# Patient Record
Sex: MALE | Race: WHITE | NOT HISPANIC OR LATINO | Employment: FULL TIME | URBAN - METROPOLITAN AREA
[De-identification: names, ages, dates, MRNs, and addresses within clinical notes are randomized per-mention and may not be internally consistent; named-entity substitution may affect disease eponyms.]

---

## 2018-10-10 ENCOUNTER — HOSPITAL ENCOUNTER (OUTPATIENT)
Dept: LAB | Facility: MEDICAL CENTER | Age: 28
End: 2018-10-10
Attending: NURSE PRACTITIONER
Payer: COMMERCIAL

## 2018-10-10 ENCOUNTER — OFFICE VISIT (OUTPATIENT)
Dept: MEDICAL GROUP | Facility: MEDICAL CENTER | Age: 28
End: 2018-10-10
Payer: COMMERCIAL

## 2018-10-10 VITALS
TEMPERATURE: 96.8 F | BODY MASS INDEX: 19.61 KG/M2 | HEIGHT: 70 IN | RESPIRATION RATE: 16 BRPM | SYSTOLIC BLOOD PRESSURE: 100 MMHG | OXYGEN SATURATION: 98 % | DIASTOLIC BLOOD PRESSURE: 70 MMHG | WEIGHT: 137 LBS | HEART RATE: 61 BPM

## 2018-10-10 DIAGNOSIS — R42 DIZZINESS: ICD-10-CM

## 2018-10-10 DIAGNOSIS — M79.662 BILATERAL CALF PAIN: ICD-10-CM

## 2018-10-10 DIAGNOSIS — F41.9 ANXIETY: ICD-10-CM

## 2018-10-10 DIAGNOSIS — M79.661 BILATERAL CALF PAIN: ICD-10-CM

## 2018-10-10 LAB
ALBUMIN SERPL BCP-MCNC: 4.8 G/DL (ref 3.2–4.9)
ALBUMIN/GLOB SERPL: 1.6 G/DL
ALP SERPL-CCNC: 53 U/L (ref 30–99)
ALT SERPL-CCNC: 17 U/L (ref 2–50)
ANION GAP SERPL CALC-SCNC: 7 MMOL/L (ref 0–11.9)
AST SERPL-CCNC: 17 U/L (ref 12–45)
BILIRUB SERPL-MCNC: 0.9 MG/DL (ref 0.1–1.5)
BUN SERPL-MCNC: 11 MG/DL (ref 8–22)
CALCIUM SERPL-MCNC: 10.1 MG/DL (ref 8.5–10.5)
CHLORIDE SERPL-SCNC: 104 MMOL/L (ref 96–112)
CO2 SERPL-SCNC: 29 MMOL/L (ref 20–33)
CREAT SERPL-MCNC: 1.08 MG/DL (ref 0.5–1.4)
ERYTHROCYTE [DISTWIDTH] IN BLOOD BY AUTOMATED COUNT: 39.7 FL (ref 35.9–50)
GLOBULIN SER CALC-MCNC: 3 G/DL (ref 1.9–3.5)
GLUCOSE SERPL-MCNC: 90 MG/DL (ref 65–99)
HCT VFR BLD AUTO: 45.7 % (ref 42–52)
HGB BLD-MCNC: 15.7 G/DL (ref 14–18)
MCH RBC QN AUTO: 30.8 PG (ref 27–33)
MCHC RBC AUTO-ENTMCNC: 34.4 G/DL (ref 33.7–35.3)
MCV RBC AUTO: 89.8 FL (ref 81.4–97.8)
PLATELET # BLD AUTO: 243 K/UL (ref 164–446)
PMV BLD AUTO: 10 FL (ref 9–12.9)
POTASSIUM SERPL-SCNC: 4.3 MMOL/L (ref 3.6–5.5)
PROT SERPL-MCNC: 7.8 G/DL (ref 6–8.2)
RBC # BLD AUTO: 5.09 M/UL (ref 4.7–6.1)
SODIUM SERPL-SCNC: 140 MMOL/L (ref 135–145)
TSH SERPL DL<=0.005 MIU/L-ACNC: 3.25 UIU/ML (ref 0.38–5.33)
WBC # BLD AUTO: 5.6 K/UL (ref 4.8–10.8)

## 2018-10-10 PROCEDURE — 84443 ASSAY THYROID STIM HORMONE: CPT

## 2018-10-10 PROCEDURE — 85027 COMPLETE CBC AUTOMATED: CPT

## 2018-10-10 PROCEDURE — 80053 COMPREHEN METABOLIC PANEL: CPT

## 2018-10-10 PROCEDURE — 36415 COLL VENOUS BLD VENIPUNCTURE: CPT

## 2018-10-10 PROCEDURE — 99203 OFFICE O/P NEW LOW 30 MIN: CPT | Performed by: NURSE PRACTITIONER

## 2018-10-10 NOTE — PROGRESS NOTES
"CC: establish care/dizziness      Osvaldo Newby is a 28 y.o. male here to establish care and to discuss the evaluation and management of:    Osvaldo is here to establish care.  He has not had a primary care in several years.  He denies any significant medical history.  He denies any significant surgical history.  He does report that his maternal grandfather did have prostate cancer.  He denies any significant family history of diabetes, hyperlipidemia, hypertension or sudden cardiac death.  He does not use tobacco products or illicit drugs.  He does have approximately 2 alcoholic beverages per week.  Currently does not take any medications on a daily basis.     Dizziness  Patient states that he has been having some dizziness for quite some time.  States that he feels like he is lightheaded approximately 70% of the time.  Denies any syncopal episodes.  He does make note that he has had some intermittent discomfort in his chest, states it is a quick sharp pain and then it goes away.  He denies any excessive consumption of caffeine, energy drinks, coffee or sodas.  He does state he stays well-hydrated.  He does make note that he does not eat regularly.  Denies any heart flutters.  He states he has not followed up with an eye care specialist in about 2 years.  He does wear corrective lenses.  Denies any vision changes.  Denies any migraines.     Anxiety  Patient makes mention that he does have some concern or possibly anxiety related to trying to figure out why he is lightheaded.  He has mentioned multiple times that this could \"just be all in his head \"and he is making it worse by over analyzing and constantly focusing on it.    Calf pain  Patient states that he is been having some intermittent achy calf pain for the last 9-12 months.  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  He has recently started since this summer riding his bike to work about 3-4 times a week.  The total trip is 8 miles.  This does not make a " "difference in his calf discomfort.  Denies any swelling in his legs.  He states he notices after he has been sitting for a long period of time.        ROS:  Denies any Headache, Blurred Vision, Confusion, Chest pain,  Shortness of breath,  Abdominal pain, Changes of bowel or bladder, Hematuria, Hematochezia, Lower ext. edema, Fevers, Nights sweats, Weight Changes, Focal weakness or numbness.  And all other systems are negative.  Lightheadedness and calf pain    No current outpatient prescriptions on file.    No Known Allergies    History reviewed. No pertinent past medical history.  History reviewed. No pertinent surgical history.  Family History   Problem Relation Age of Onset   • No Known Problems Mother    • No Known Problems Father    • Prostate cancer Maternal Grandfather      Social History     Social History   • Marital status:      Spouse name: N/A   • Number of children: N/A   • Years of education: N/A     Occupational History   • Not on file.     Social History Main Topics   • Smoking status: Never Smoker   • Smokeless tobacco: Never Used   • Alcohol use Yes      Comment: 2 drinks per week   • Drug use: No   • Sexual activity: Yes     Partners: Female     Other Topics Concern   • Not on file     Social History Narrative   • No narrative on file       Objective:     Vitals: /70 (BP Location: Right arm, Patient Position: Sitting)   Pulse 61   Temp 36 °C (96.8 °F)   Resp 16   Ht 1.778 m (5' 10\")   Wt 62.1 kg (137 lb)   SpO2 98%   BMI 19.66 kg/m²      General: Alert, pleasant, NAD  HEENT:  Normocephalic.    Heart:  Regular rate and rhythm.  S1 and S2 normal.  No murmurs appreciated.    Respiratory:  Normal respiratory effort.  Clear to auscultation bilaterally.    Skin:  Warm, dry, no rashes  Musculoskeletal:  Gait is normal.  Moves all extremities well.  Extremities:   No leg edema.  Neurological: No tremors, sensation grossly intact  Psych:  Affect/mood is normal, judgement is good, " memory is intact, grooming is appropriate.      Assessment and Plan.   28 y.o. male to establish care and discuss the following    1. Dizziness  Chronic. Not affecting daily functions. Patient denies vertigo symptoms.  However he states that he just feels lightheaded all the time.  There is no difference whether he is going from sitting to standing or with positional changes.  No complaints of any upper respiratory infections, ear pain or nasal congestion to support a concern for his inner ear. However have not completely ruled out.  have encouraged him to continue hydration and ensure adequate nutrition as he states that he sometimes does not eat regularly.  Neuro intact.  Will order labs to rule out thyroid or electrolyte dysfunction and anemia.  Encouraged him to follow-up with his eye care specialist.  - COMP METABOLIC PANEL; Future  - TSH WITH REFLEX TO FT4; Future  - CBC WITHOUT DIFFERENTIAL; Future    2. Anxiety  Controlled.  Seems to be stemming from the fact that he has not been able to figure why he is lightheaded.  He has made mention multiple times throughout the conversation that he seems to be focusing on this and interested in what the reason is for the lightheadedness.  Have discussed with the patient that he could start journaling or keeping track of when he is feeling a bit anxious and if his symptoms of dizziness have increased.    3. Bilateral calf pain  Have discussed with patient that at this time there is low concern for a DVT.  Have encouraged him to stretch after riding his bike, encouraged hydration and possibly electrolyte beverage consumption.        Return if symptoms worsen or fail to improve.          Kaelyn OLSEN.

## 2021-07-22 ENCOUNTER — TELEPHONE (OUTPATIENT)
Dept: SCHEDULING | Facility: IMAGING CENTER | Age: 31
End: 2021-07-22

## 2021-08-10 ENCOUNTER — OFFICE VISIT (OUTPATIENT)
Dept: MEDICAL GROUP | Facility: MEDICAL CENTER | Age: 31
End: 2021-08-10
Payer: COMMERCIAL

## 2021-08-10 VITALS
HEART RATE: 56 BPM | TEMPERATURE: 96.9 F | BODY MASS INDEX: 18.43 KG/M2 | HEIGHT: 71 IN | DIASTOLIC BLOOD PRESSURE: 64 MMHG | OXYGEN SATURATION: 100 % | SYSTOLIC BLOOD PRESSURE: 104 MMHG | WEIGHT: 131.61 LBS

## 2021-08-10 DIAGNOSIS — Z23 NEED FOR VACCINATION: ICD-10-CM

## 2021-08-10 DIAGNOSIS — Z12.83 SCREENING FOR SKIN CANCER: ICD-10-CM

## 2021-08-10 DIAGNOSIS — Z00.00 ROUTINE GENERAL MEDICAL EXAMINATION AT A HEALTH CARE FACILITY: ICD-10-CM

## 2021-08-10 PROBLEM — R42 DIZZINESS: Status: RESOLVED | Noted: 2018-10-10 | Resolved: 2021-08-10

## 2021-08-10 PROCEDURE — 90715 TDAP VACCINE 7 YRS/> IM: CPT | Performed by: NURSE PRACTITIONER

## 2021-08-10 PROCEDURE — 99395 PREV VISIT EST AGE 18-39: CPT | Mod: 25 | Performed by: NURSE PRACTITIONER

## 2021-08-10 PROCEDURE — 90471 IMMUNIZATION ADMIN: CPT | Performed by: NURSE PRACTITIONER

## 2021-08-10 ASSESSMENT — PATIENT HEALTH QUESTIONNAIRE - PHQ9: CLINICAL INTERPRETATION OF PHQ2 SCORE: 0

## 2021-08-10 NOTE — PROGRESS NOTES
cc: well exam    Subjective:     Osvaldo Newby is a 30 y.o. male presents for a routine preventive health exam.    1. Routine general medical examination at a health care facility  Presents for routine medical exam. Last OV 10/2018.  Requesting referral to dermatology for routine skin cancer screening.  Also mentions that he does have occasional calf pain when he sits for too long.  Patient does have some mild varicosities on BLE, left greater than right.  Recommend compression stockings at this time.    2. Need for vaccination  Due for TDap.     Health Maintenance  Advanced directive: n/a   Osteoporosis Screen/ DEXA: n/a   PT/vit D for falls prevention: n/a   Cholesterol Screening: n/a   Diabetes Screening: n/a  AAA Screening:    TSH: completed   Aspirin Use: n/a    Diet: regular   Exercise: some, running   Substance Abuse: denies   Seat belts, bike helmet, gun safety discussed.  Sun protection used.    Cancer screening  Colorectal Cancer Screening: n/a    Lung Cancer Screening: n/a    Prostate Cancer Screening/PSA: n/a     Infectious disease screening/Immunizations  --STI Screening: denies   --Practices safe sex.  --HIV Screening: declines   --Hepatitis C Screening: declines   --Immunizations:    Influenza: done    HPV:  n/a    Tetanus: done    MMR: done    Varicella: done    Shingles: n/a    Pneumococcal : done          Review of systems:  See above.   All other systems were reviewed and are negative.    No current outpatient medications on file.    No Known Allergies    History reviewed. No pertinent past medical history.  History reviewed. No pertinent surgical history.  Family History   Problem Relation Age of Onset   • No Known Problems Mother    • No Known Problems Father    • Breast Cancer Maternal Grandmother    • Prostate cancer Maternal Grandfather    • Lung Cancer Paternal Grandmother      Social History     Socioeconomic History   • Marital status:      Spouse name: Not on file   • Number of  "children: Not on file   • Years of education: Not on file   • Highest education level: Not on file   Occupational History   • Not on file   Tobacco Use   • Smoking status: Never Smoker   • Smokeless tobacco: Never Used   Vaping Use   • Vaping Use: Never used   Substance and Sexual Activity   • Alcohol use: Yes     Comment: 3 drinks per week   • Drug use: No   • Sexual activity: Yes     Partners: Female   Other Topics Concern   • Not on file   Social History Narrative   • Not on file     Social Determinants of Health     Financial Resource Strain:    • Difficulty of Paying Living Expenses:    Food Insecurity:    • Worried About Running Out of Food in the Last Year:    • Ran Out of Food in the Last Year:    Transportation Needs:    • Lack of Transportation (Medical):    • Lack of Transportation (Non-Medical):    Physical Activity:    • Days of Exercise per Week:    • Minutes of Exercise per Session:    Stress:    • Feeling of Stress :    Social Connections:    • Frequency of Communication with Friends and Family:    • Frequency of Social Gatherings with Friends and Family:    • Attends Latter day Services:    • Active Member of Clubs or Organizations:    • Attends Club or Organization Meetings:    • Marital Status:    Intimate Partner Violence:    • Fear of Current or Ex-Partner:    • Emotionally Abused:    • Physically Abused:    • Sexually Abused:        Objective:     Vitals: /64 (BP Location: Right arm, Patient Position: Sitting, BP Cuff Size: Adult)   Pulse (!) 56   Temp 36.1 °C (96.9 °F) (Temporal)   Ht 1.791 m (5' 10.5\")   Wt 59.7 kg (131 lb 9.8 oz)   SpO2 100%   BMI 18.62 kg/m²   General: Alert, pleasant, NAD  HEENT:  Normocephalic.  PERRL, EOMI, no icterus or pallor.  Conjunctivae and lids normal.  External ears normal. Tympanic membranes pearly, opaque.  Nares patent, mucosa pink.  Oropharynx non-erythematous, mucous membranes moist.  Neck supple.  No thyromegaly or masses palpated. No cervical or " supraclavicular lymphadenopathy.  Heart:  Regular rate and rhythm.  S1 and S2 normal.  No murmurs appreciated.  Respiratory:  Normal respiratory effort.  Clear to auscultation bilaterally.    Skin:  Warm, dry, no rashes  Musculoskeletal:  Gait is normal.  Moves all extremities well.  Extremities: . No leg edema.  Neurological: No tremors, sensation grossly intact, patellar and biceps reflexes 2+ symmetric, tone/strength normal, gait is normal, CN2-12 intact  Psych:  Affect/mood is normal, judgement is good, memory is intact, grooming is appropriate.    Assessment/Plan:     Osvaldo was seen today for annual exam.    Diagnoses and all orders for this visit:    Routine general medical examination at a health care facility  Healthy 30-year-old male.  Continue to stay active, recommend heart healthy diet, avoid tobacco, illicit drug use, limit EtOH use.  No labs indicated at this time.  Follow-up yearly.    Screening for skin cancer  -     REFERRAL TO DERMATOLOGY    Need for vaccination  -     Tdap Vaccine =>8YO IM          Patient Counseling:  --Discussed moderation in sodium/caffeine intake, saturated fat and cholesterol, caloric balance, sufficient fresh fruits/vegetables, fiber, iron, and 0.4-0.8mg of folate supplement per day (for females capable of pregnancy).  --Discussed brushing, flossing, and dental visits.   --Encouraged regular exercise.   --Discussed tobacco, alcohol, or other drug use; availability of treatment for abuse.   --Discussed sexually transmitted infections, partner selection, use of condoms, avoidance of unintended pregnancy and contraceptive alternatives.  --Discussed the issue of estrogen replacement, calcium supplement, and the daily use of baby aspirin.  --Injury prevention: Discussed safety belts, safety helmets, smoke detector, etc.    Preventive visit in 1 year, sooner as needed for any concerns.       I have placed the below orders and discussed them with an approved delegating provider.   The MA is performing the below orders under the direction of Dr. Torres

## 2021-11-30 ENCOUNTER — APPOINTMENT (RX ONLY)
Dept: URBAN - METROPOLITAN AREA CLINIC 4 | Facility: CLINIC | Age: 31
Setting detail: DERMATOLOGY
End: 2021-11-30

## 2021-11-30 DIAGNOSIS — Z71.89 OTHER SPECIFIED COUNSELING: ICD-10-CM

## 2021-11-30 DIAGNOSIS — L81.4 OTHER MELANIN HYPERPIGMENTATION: ICD-10-CM

## 2021-11-30 DIAGNOSIS — D22 MELANOCYTIC NEVI: ICD-10-CM

## 2021-11-30 PROBLEM — D22.5 MELANOCYTIC NEVI OF TRUNK: Status: ACTIVE | Noted: 2021-11-30

## 2021-11-30 PROBLEM — D22.62 MELANOCYTIC NEVI OF LEFT UPPER LIMB, INCLUDING SHOULDER: Status: ACTIVE | Noted: 2021-11-30

## 2021-11-30 PROBLEM — D22.71 MELANOCYTIC NEVI OF RIGHT LOWER LIMB, INCLUDING HIP: Status: ACTIVE | Noted: 2021-11-30

## 2021-11-30 PROBLEM — D22.4 MELANOCYTIC NEVI OF SCALP AND NECK: Status: ACTIVE | Noted: 2021-11-30

## 2021-11-30 PROBLEM — D22.72 MELANOCYTIC NEVI OF LEFT LOWER LIMB, INCLUDING HIP: Status: ACTIVE | Noted: 2021-11-30

## 2021-11-30 PROBLEM — D22.61 MELANOCYTIC NEVI OF RIGHT UPPER LIMB, INCLUDING SHOULDER: Status: ACTIVE | Noted: 2021-11-30

## 2021-11-30 PROCEDURE — ? COUNSELING

## 2021-11-30 PROCEDURE — 99203 OFFICE O/P NEW LOW 30 MIN: CPT

## 2021-11-30 PROCEDURE — ? REFERRAL CORRESPONDENCE

## 2021-11-30 ASSESSMENT — LOCATION DETAILED DESCRIPTION DERM
LOCATION DETAILED: SUPERIOR THORACIC SPINE
LOCATION DETAILED: RIGHT POSTERIOR SHOULDER
LOCATION DETAILED: LEFT DISTAL POSTERIOR UPPER ARM
LOCATION DETAILED: LEFT SUPERIOR PARIETAL SCALP
LOCATION DETAILED: EPIGASTRIC SKIN
LOCATION DETAILED: RIGHT ANTERIOR DISTAL UPPER ARM
LOCATION DETAILED: LEFT POSTERIOR SHOULDER
LOCATION DETAILED: LEFT DISTAL CALF
LOCATION DETAILED: LEFT ANTERIOR DISTAL UPPER ARM
LOCATION DETAILED: STERNUM
LOCATION DETAILED: RIGHT ANTERIOR PROXIMAL THIGH
LOCATION DETAILED: LEFT ANTERIOR DISTAL THIGH
LOCATION DETAILED: RIGHT DISTAL CALF
LOCATION DETAILED: RIGHT PROXIMAL POSTERIOR UPPER ARM

## 2021-11-30 ASSESSMENT — LOCATION ZONE DERM
LOCATION ZONE: TRUNK
LOCATION ZONE: ARM
LOCATION ZONE: SCALP
LOCATION ZONE: LEG

## 2021-11-30 ASSESSMENT — LOCATION SIMPLE DESCRIPTION DERM
LOCATION SIMPLE: SCALP
LOCATION SIMPLE: RIGHT SHOULDER
LOCATION SIMPLE: LEFT UPPER ARM
LOCATION SIMPLE: ABDOMEN
LOCATION SIMPLE: RIGHT UPPER ARM
LOCATION SIMPLE: CHEST
LOCATION SIMPLE: LEFT CALF
LOCATION SIMPLE: LEFT THIGH
LOCATION SIMPLE: RIGHT CALF
LOCATION SIMPLE: RIGHT THIGH
LOCATION SIMPLE: UPPER BACK
LOCATION SIMPLE: LEFT SHOULDER

## 2022-04-08 ENCOUNTER — OFFICE VISIT (OUTPATIENT)
Dept: MEDICAL GROUP | Facility: MEDICAL CENTER | Age: 32
End: 2022-04-08
Payer: COMMERCIAL

## 2022-04-08 VITALS
OXYGEN SATURATION: 97 % | HEART RATE: 71 BPM | SYSTOLIC BLOOD PRESSURE: 110 MMHG | WEIGHT: 136.24 LBS | HEIGHT: 71 IN | TEMPERATURE: 97.8 F | BODY MASS INDEX: 19.07 KG/M2 | DIASTOLIC BLOOD PRESSURE: 56 MMHG

## 2022-04-08 DIAGNOSIS — M53.3 SACRAL PAIN: ICD-10-CM

## 2022-04-08 DIAGNOSIS — M25.541 ARTHRALGIA OF RIGHT HAND: ICD-10-CM

## 2022-04-08 DIAGNOSIS — M79.89 SWELLING OF RIGHT HAND: ICD-10-CM

## 2022-04-08 PROCEDURE — 99213 OFFICE O/P EST LOW 20 MIN: CPT | Performed by: NURSE PRACTITIONER

## 2022-04-08 ASSESSMENT — PATIENT HEALTH QUESTIONNAIRE - PHQ9: CLINICAL INTERPRETATION OF PHQ2 SCORE: 0

## 2022-04-08 NOTE — PROGRESS NOTES
Chief Complaint   Patient presents with   • Back Pain     R side lower back near tailbone x5w. Tried Stretching; Sx fail to improve. Pain Scale: 3   • Hand Swelling     R ring finger x several mths         Subjective:     HPI:     Osvaldo Newby is a 31 y.o. male   here to discuss the evaluation and management of:     Having pain around sacral area-right side for about 6 weeks. No trauma or injury. No new exercise routines. Had a bad knee injury 12 years ago. He is a runner and wonders if is contributing to his pain. No IT band pain. Has done some quadricept stretching without any improvement. This week pain has lessen this week. Uncomfortable to sit. Got a standing desk.     He is also reporting having redness/itching and swelling that starts in his ring finger then spreads to his other fingers. Starts to swell, becomes red and stiff.  Then spread to other fingers and stiff. Happens for a few days for the last  2-3 years. Last time this happened was in January. Hurts to bend fingers.   No rash.   No fevers.   No other joint pain.   Last about 8-10 days.   No pattern.           ROS:  Denies any Headache, Blurred Vision, Confusion, Chest pain,  Shortness of breath,  Abdominal pain, Changes of bowel or bladder, Lower ext edema, Fevers, Nights sweats, Weight Changes, Focal weakness or numbness.  And all other systems reviewed and are all negative. POSITIVE FOR : see above      No current outpatient medications on file.    No Known Allergies    History reviewed. No pertinent past medical history.  History reviewed. No pertinent surgical history.  Family History   Problem Relation Age of Onset   • No Known Problems Mother    • No Known Problems Father    • Breast Cancer Maternal Grandmother    • Prostate cancer Maternal Grandfather    • Lung Cancer Paternal Grandmother      Social History     Socioeconomic History   • Marital status:      Spouse name: Not on file   • Number of children: Not on file   • Years of  "education: Not on file   • Highest education level: Not on file   Occupational History   • Not on file   Tobacco Use   • Smoking status: Never Smoker   • Smokeless tobacco: Never Used   Vaping Use   • Vaping Use: Never used   Substance and Sexual Activity   • Alcohol use: Yes     Comment: 3 drinks per week   • Drug use: No   • Sexual activity: Yes     Partners: Female   Other Topics Concern   • Not on file   Social History Narrative   • Not on file     Social Determinants of Health     Financial Resource Strain: Not on file   Food Insecurity: Not on file   Transportation Needs: Not on file   Physical Activity: Not on file   Stress: Not on file   Social Connections: Not on file   Intimate Partner Violence: Not on file   Housing Stability: Not on file       Objective:     Vitals: /56 (BP Location: Right arm, Patient Position: Sitting, BP Cuff Size: Adult)   Pulse 71   Temp 36.6 °C (97.8 °F) (Temporal)   Ht 1.791 m (5' 10.5\")   Wt 61.8 kg (136 lb 3.9 oz)   SpO2 97%   BMI 19.27 kg/m²    General: Alert, pleasant, NAD  HEENT: Normocephalic.  Neck supple.   Respiratory: no distress, no audible wheezing, RR -WNL  Skin: Warm, dry, no rashes.  Extremities: No leg edema. No discoloration  Neurological: No tremors  Psych:  Affect/mood is normal, judgement is good, memory is intact, grooming is appropriate.    Assessment/Plan:     Osvaldo was seen today for back pain and hand swelling.    Diagnoses and all orders for this visit:    Arthralgia of right hand.  Chronic, intermittent. Unknown etiology. Obtain the following labs and imaging to evaluate for inflammatory etiology.   -     DX-JOINT SURVEY-HANDS SINGLE VIEW; Future  -     Comp Metabolic Panel; Future  -     RHEUMATOID ARTHRITIS FACTOR; Future  -     Sed Rate; Future  -     CCP ANTIBODY; Future  -     TSH WITH REFLEX TO FT4; Future  -     URIC ACID; Future  -     CBC WITH DIFFERENTIAL; Future  -     FERRITIN; Future  -     FRANCISCO REFLEXIVE PROFILE; " Future    Swelling of right hand  See above.  -     DX-JOINT SURVEY-HANDS SINGLE VIEW; Future  -     Comp Metabolic Panel; Future  -     RHEUMATOID ARTHRITIS FACTOR; Future  -     Sed Rate; Future  -     CCP ANTIBODY; Future  -     TSH WITH REFLEX TO FT4; Future  -     URIC ACID; Future  -     CBC WITH DIFFERENTIAL; Future  -     FRANCISCO REFLEXIVE PROFILE; Future    Sacral pain  Acute, improving. No trauma or injury. No red flags. Recommend stretching targeting gluteals and piriformis. If not improving in the next 6 weeks or worsening then consider imaging.     Return if symptoms worsen or fail to improve, for or to review imaging/labs..          Kaelyn OLSEN.

## 2022-04-15 ENCOUNTER — HOSPITAL ENCOUNTER (OUTPATIENT)
Dept: LAB | Facility: MEDICAL CENTER | Age: 32
End: 2022-04-15
Attending: NURSE PRACTITIONER
Payer: COMMERCIAL

## 2022-04-15 ENCOUNTER — HOSPITAL ENCOUNTER (OUTPATIENT)
Dept: RADIOLOGY | Facility: MEDICAL CENTER | Age: 32
End: 2022-04-15
Attending: NURSE PRACTITIONER
Payer: COMMERCIAL

## 2022-04-15 DIAGNOSIS — M79.89 SWELLING OF RIGHT HAND: ICD-10-CM

## 2022-04-15 DIAGNOSIS — M25.541 ARTHRALGIA OF RIGHT HAND: ICD-10-CM

## 2022-04-15 LAB
ALBUMIN SERPL BCP-MCNC: 5 G/DL (ref 3.2–4.9)
ALBUMIN/GLOB SERPL: 1.7 G/DL
ALP SERPL-CCNC: 57 U/L (ref 30–99)
ALT SERPL-CCNC: 23 U/L (ref 2–50)
ANION GAP SERPL CALC-SCNC: 13 MMOL/L (ref 7–16)
AST SERPL-CCNC: 14 U/L (ref 12–45)
BASOPHILS # BLD AUTO: 0.9 % (ref 0–1.8)
BASOPHILS # BLD: 0.05 K/UL (ref 0–0.12)
BILIRUB SERPL-MCNC: 0.8 MG/DL (ref 0.1–1.5)
BUN SERPL-MCNC: 11 MG/DL (ref 8–22)
CALCIUM SERPL-MCNC: 9.5 MG/DL (ref 8.5–10.5)
CHLORIDE SERPL-SCNC: 106 MMOL/L (ref 96–112)
CO2 SERPL-SCNC: 26 MMOL/L (ref 20–33)
CREAT SERPL-MCNC: 0.93 MG/DL (ref 0.5–1.4)
EOSINOPHIL # BLD AUTO: 0.12 K/UL (ref 0–0.51)
EOSINOPHIL NFR BLD: 2.1 % (ref 0–6.9)
ERYTHROCYTE [DISTWIDTH] IN BLOOD BY AUTOMATED COUNT: 39.9 FL (ref 35.9–50)
ERYTHROCYTE [SEDIMENTATION RATE] IN BLOOD BY WESTERGREN METHOD: 5 MM/HOUR (ref 0–20)
FERRITIN SERPL-MCNC: 252 NG/ML (ref 22–322)
GFR SERPLBLD CREATININE-BSD FMLA CKD-EPI: 112 ML/MIN/1.73 M 2
GLOBULIN SER CALC-MCNC: 3 G/DL (ref 1.9–3.5)
GLUCOSE SERPL-MCNC: 92 MG/DL (ref 65–99)
HCT VFR BLD AUTO: 45.8 % (ref 42–52)
HGB BLD-MCNC: 15.5 G/DL (ref 14–18)
IMM GRANULOCYTES # BLD AUTO: 0.01 K/UL (ref 0–0.11)
IMM GRANULOCYTES NFR BLD AUTO: 0.2 % (ref 0–0.9)
LYMPHOCYTES # BLD AUTO: 2.05 K/UL (ref 1–4.8)
LYMPHOCYTES NFR BLD: 35.6 % (ref 22–41)
MCH RBC QN AUTO: 30.5 PG (ref 27–33)
MCHC RBC AUTO-ENTMCNC: 33.8 G/DL (ref 33.7–35.3)
MCV RBC AUTO: 90 FL (ref 81.4–97.8)
MONOCYTES # BLD AUTO: 0.58 K/UL (ref 0–0.85)
MONOCYTES NFR BLD AUTO: 10.1 % (ref 0–13.4)
NEUTROPHILS # BLD AUTO: 2.95 K/UL (ref 1.82–7.42)
NEUTROPHILS NFR BLD: 51.1 % (ref 44–72)
NRBC # BLD AUTO: 0 K/UL
NRBC BLD-RTO: 0 /100 WBC
PLATELET # BLD AUTO: 256 K/UL (ref 164–446)
PMV BLD AUTO: 9.5 FL (ref 9–12.9)
POTASSIUM SERPL-SCNC: 4.3 MMOL/L (ref 3.6–5.5)
PROT SERPL-MCNC: 8 G/DL (ref 6–8.2)
RBC # BLD AUTO: 5.09 M/UL (ref 4.7–6.1)
RHEUMATOID FACT SER IA-ACNC: <10 IU/ML (ref 0–14)
SODIUM SERPL-SCNC: 145 MMOL/L (ref 135–145)
TSH SERPL DL<=0.005 MIU/L-ACNC: 2.68 UIU/ML (ref 0.38–5.33)
URATE SERPL-MCNC: 6.5 MG/DL (ref 2.5–8.3)
WBC # BLD AUTO: 5.8 K/UL (ref 4.8–10.8)

## 2022-04-15 PROCEDURE — 36415 COLL VENOUS BLD VENIPUNCTURE: CPT

## 2022-04-15 PROCEDURE — 84550 ASSAY OF BLOOD/URIC ACID: CPT

## 2022-04-15 PROCEDURE — 85025 COMPLETE CBC W/AUTO DIFF WBC: CPT

## 2022-04-15 PROCEDURE — 86431 RHEUMATOID FACTOR QUANT: CPT

## 2022-04-15 PROCEDURE — 85652 RBC SED RATE AUTOMATED: CPT

## 2022-04-15 PROCEDURE — 84443 ASSAY THYROID STIM HORMONE: CPT

## 2022-04-15 PROCEDURE — 86200 CCP ANTIBODY: CPT

## 2022-04-15 PROCEDURE — 86038 ANTINUCLEAR ANTIBODIES: CPT

## 2022-04-15 PROCEDURE — 80053 COMPREHEN METABOLIC PANEL: CPT

## 2022-04-15 PROCEDURE — 77077 JOINT SURVEY SINGLE VIEW: CPT

## 2022-04-15 PROCEDURE — 82728 ASSAY OF FERRITIN: CPT

## 2022-04-16 LAB
CCP IGG SERPL-ACNC: 4 UNITS (ref 0–19)
NUCLEAR IGG SER QL IA: NORMAL

## 2024-04-09 ENCOUNTER — OFFICE VISIT (OUTPATIENT)
Dept: MEDICAL GROUP | Facility: CLINIC | Age: 34
End: 2024-04-09
Payer: COMMERCIAL

## 2024-04-09 VITALS
BODY MASS INDEX: 19.76 KG/M2 | SYSTOLIC BLOOD PRESSURE: 114 MMHG | HEIGHT: 70 IN | HEART RATE: 55 BPM | OXYGEN SATURATION: 97 % | DIASTOLIC BLOOD PRESSURE: 66 MMHG | WEIGHT: 138 LBS | TEMPERATURE: 97.5 F

## 2024-04-09 DIAGNOSIS — M25.561 CHRONIC PAIN OF RIGHT KNEE: ICD-10-CM

## 2024-04-09 DIAGNOSIS — G89.29 CHRONIC PAIN OF RIGHT KNEE: ICD-10-CM

## 2024-04-09 PROCEDURE — 99203 OFFICE O/P NEW LOW 30 MIN: CPT | Mod: GE | Performed by: STUDENT IN AN ORGANIZED HEALTH CARE EDUCATION/TRAINING PROGRAM

## 2024-04-09 ASSESSMENT — ENCOUNTER SYMPTOMS
RESPIRATORY NEGATIVE: 1
CARDIOVASCULAR NEGATIVE: 1
GASTROINTESTINAL NEGATIVE: 1
CONSTITUTIONAL NEGATIVE: 1

## 2024-04-09 ASSESSMENT — FIBROSIS 4 INDEX: FIB4 SCORE: 0.91

## 2024-04-09 NOTE — PROGRESS NOTES
"Subjective:     CC:  The encounter diagnosis was Chronic pain of right knee.    HISTORY OF THE PRESENT ILLNESS: Patient is a 33 y.o. male. This pleasant patient is here today to establish care and discuss Right knee pain from ka few years ago and establish care. . His/her prior PCP was RAÚL Russ.    Injured it a 14 years ago, then now flairing up a more and more specifically with running, tennis. Wore a knee brace that helped for a while.  Reports that its mostly irritated at the end of the day or with prolonged sitting.  Especially on car rides he feels that he has to get up and stretch it out multiple times.  Feels tightness Tylenol lateral aspect of his thigh through his knee.  As well as tightness in his hamstrings.     He denies any swelling to the area, denies any locking catching or weakness.        Problem   Chronic Pain of Right Knee       No current Epic-ordered outpatient medications on file.     No current Epic-ordered facility-administered medications on file.     ROS:   Review of Systems   Constitutional: Negative.    HENT: Negative.     Respiratory: Negative.     Cardiovascular: Negative.    Gastrointestinal: Negative.    Musculoskeletal:  Positive for joint pain.   Skin: Negative.          Objective:       Exam: /66 (BP Location: Left arm, Patient Position: Sitting, BP Cuff Size: Adult)   Pulse (!) 55   Temp 36.4 °C (97.5 °F) (Temporal)   Ht 1.778 m (5' 10\")   Wt 62.6 kg (138 lb)   SpO2 97%  Body mass index is 19.8 kg/m².    Physical Exam  Constitutional:       Appearance: Normal appearance.   HENT:      Head: Normocephalic and atraumatic.      Nose: Nose normal.      Mouth/Throat:      Mouth: Mucous membranes are moist.   Eyes:      Extraocular Movements: Extraocular movements intact.      Conjunctiva/sclera: Conjunctivae normal.      Pupils: Pupils are equal, round, and reactive to light.   Cardiovascular:      Rate and Rhythm: Normal rate and regular rhythm.      " Pulses: Normal pulses.      Heart sounds: Normal heart sounds.   Pulmonary:      Effort: Pulmonary effort is normal.      Breath sounds: Normal breath sounds.   Abdominal:      General: Abdomen is flat.      Palpations: Abdomen is soft.   Musculoskeletal:      Cervical back: Normal range of motion.      Right knee: No tenderness. No LCL laxity, MCL laxity, ACL laxity or PCL laxity.      Instability Tests: Anterior drawer test negative. Posterior drawer test negative. Anterior Lachman test negative. Medial Sharon test negative and lateral Sharon test negative.      Left knee: Normal.   Skin:     General: Skin is warm.      Capillary Refill: Capillary refill takes less than 2 seconds.   Neurological:      General: No focal deficit present.      Mental Status: He is alert and oriented to person, place, and time.               Assessment & Plan:   33 y.o. male with the following -    Problem List Items Addressed This Visit       Chronic pain of right knee     Will send a referral to physical therapy at this time.  I recommend at least 6 weeks trial of intensive physical therapy and stretching exercises.  - We discussed topical Voltaren gel to be used for his knee pain.  - If patient has intense pain especially at the end of stretching or working out can use ibuprofen and or Tylenol for the pain as well.  - If no improvement in the next 6 weeks we can consider further imaging.         Relevant Orders    Referral to Physical Therapy         Return if symptoms worsen or fail to improve, for Or for annual exam.

## 2024-04-09 NOTE — ASSESSMENT & PLAN NOTE
Will send a referral to physical therapy at this time.  I recommend at least 6 weeks trial of intensive physical therapy and stretching exercises.  - We discussed topical Voltaren gel to be used for his knee pain.  - If patient has intense pain especially at the end of stretching or working out can use ibuprofen and or Tylenol for the pain as well.  - If no improvement in the next 6 weeks we can consider further imaging.

## 2024-04-24 NOTE — OP THERAPY EVALUATION
Outpatient Physical Therapy  INITIAL EVALUATION    [facility] [address], [address]  [place] [address]  Phone:  [phone]    Date of Evaluation: 04/25/2024    Patient: Osvaldo Newby  YOB: 1990  MRN: [id number]     Referring Provider: Pollo Perkins M.D.  [address] [place] [address]   Referring Diagnosis Chronic pain of right knee [M25.561, G89.29]     Time Calculation                 Chief Complaint: No chief complaint on file.    Visit Diagnoses     ICD-10-CM   1. Chronic pain of right knee  M25.561    G89.29       Date of onset of impairment: No data found    Subjective:   History of Present Illness:     Mechanism of injury:  CMHx:    Pain Behavior  Sxs:    Aggs:    Eas:    24 hour:    Sleep:    Irritability:    Yellow flags:    Imaging:    PMHx:    Profession/Recreation:    Goals:                            No past medical history on file.  No past surgical history on file.  Social History     Tobacco Use    Smoking status: Never    Smokeless tobacco: Never   Substance Use Topics    Alcohol use: Yes     Comment: 3 drinks per week     Family and Occupational History     Socioeconomic History    Marital status:      Spouse name: Not on file    Number of children: Not on file    Years of education: Not on file    Highest education level: Not on file   Occupational History    Not on file       Objective     General Comments     Knee Comments  Standing:  Functional tests  5 rep STS  TUG  3 MWT  Step Up  Step Down  Squat DL  Squat SL  SLS  Y balance    Supine:  AROM  Flexion  Extension    PROM  Flexion  Flexion with IR  Flexion with ER  Extension    MMT  Knee extension  Knee flexion    MM length  Hamstring length  Modified Carlito    Ligamentous tests  ACL (lachman's, anterior drawer)  PCL (posterior drawer, lag sign, active quad test)  MCL (valgus stress at 0, 30 deg)  LCL (varus stress at 0, 30 deg)  Pivot Shift    Meniscal  "Screens  Sharon's screen  Thealtagracia's  Appleys    PFJ mobility  Superior glide  Inferior glide  Medial glide    Sidelying:  Dalila's  Noble Compression  Prox Tib/Fib glide  Medial glide/tilt of patella    Hip MMT  Hip ABD  Hip ER    Prone:  Hip MMT  Extension  Knee flexion    Rectus length/PKB    Hip PROM  IR  ER  Ext          Therapeutic Exercises (CPT 81208):     1. UPOC 6/25/24      Therapeutic Exercise Summary: Home Exercise Program Created and Reviewed with patient        Time-based treatments/modalities:           Assessment, Response and Plan:   Impairments: impaired functional mobility, lacks appropriate home exercise program and pain with function    Assessment details:  ***PT finds s/sx consistent with pain and mobility deficits at the (L/R) knee. This is evident with  decreased A/PROM, subjective pattern of stiffness/pain, and functional assessment.    PT finds s/sx consistent with stability/movement coordination impairments. This is evident with  JOON, \"giving\" way, and subjective description, and findings of motor impairments with knee  dominant movement strategies (valgus, anterior knee translation).    PT finds s/sx consistent with PFPS/anterior knee pain syndrome. This is evident with subjective  pattern of pain/sxs, and pain with PF compression functionally.    PT finds s/sx consistent with ITBS/lateral knee pain. This is evident with location/description of  symptoms, and TTP and compression around ITB region.    PT finds s/sx consistent with patellar tendinopathy. This is evident with subjective  description/location, TTP to patellar tendon, and pain with quadriceps load.    Pt has power/coordination deficits functionally with knee dominant strategy, valgus position, and  decreased hip muscle function proximally.    Pt has distal deficits that may be contributing with (pronation and lack of DF mobility at the ankle  which may be leading to increased anterior knee loading)    Prognosis: fair  "   Goals:   Short Term Goals:   -pt meets MCID for LEFS  Short term goal time span:  2-4 weeks  Long term goal time span:  6-8 weeks    Plan:   Therapy options:  Physical therapy treatment to continue  Planned therapy interventions:  E Stim Attended (CPT 12739), E Stim Unattended (CPT 35312), Manual Therapy (CPT 16770), Neuromuscular Re-education (CPT 76631), Mechanical Traction (CPT 17657), Therapeutic Activities (CPT 10506), Therapeutic Exercise (CPT 61735) and Hot or Cold Pack Therapy (CPT 19290)  Frequency:  2x week  Duration in weeks:  8  Duration in visits:  16  Discussed with:  Patient      Functional Assessment Used        Referring provider co-signature:  I have reviewed this plan of care and my co-signature certifies the need for services.    Certification Period: 04/25/2024 to  06/26/24    Physician Signature: ________________________________ Date: ______________

## 2024-04-25 ENCOUNTER — APPOINTMENT (OUTPATIENT)
Dept: PHYSICAL THERAPY | Facility: REHABILITATION | Age: 34
End: 2024-04-25
Attending: STUDENT IN AN ORGANIZED HEALTH CARE EDUCATION/TRAINING PROGRAM
Payer: COMMERCIAL

## 2024-05-02 ENCOUNTER — APPOINTMENT (OUTPATIENT)
Dept: PHYSICAL THERAPY | Facility: REHABILITATION | Age: 34
End: 2024-05-02
Attending: STUDENT IN AN ORGANIZED HEALTH CARE EDUCATION/TRAINING PROGRAM
Payer: COMMERCIAL

## 2024-06-17 ENCOUNTER — APPOINTMENT (OUTPATIENT)
Dept: PHYSICAL THERAPY | Facility: REHABILITATION | Age: 34
End: 2024-06-17
Attending: STUDENT IN AN ORGANIZED HEALTH CARE EDUCATION/TRAINING PROGRAM
Payer: COMMERCIAL

## 2024-06-24 ENCOUNTER — APPOINTMENT (OUTPATIENT)
Dept: PHYSICAL THERAPY | Facility: REHABILITATION | Age: 34
End: 2024-06-24
Attending: STUDENT IN AN ORGANIZED HEALTH CARE EDUCATION/TRAINING PROGRAM
Payer: COMMERCIAL

## 2024-07-01 ENCOUNTER — APPOINTMENT (OUTPATIENT)
Dept: PHYSICAL THERAPY | Facility: REHABILITATION | Age: 34
End: 2024-07-01
Attending: STUDENT IN AN ORGANIZED HEALTH CARE EDUCATION/TRAINING PROGRAM
Payer: COMMERCIAL

## 2024-07-08 ENCOUNTER — APPOINTMENT (OUTPATIENT)
Dept: PHYSICAL THERAPY | Facility: REHABILITATION | Age: 34
End: 2024-07-08
Attending: STUDENT IN AN ORGANIZED HEALTH CARE EDUCATION/TRAINING PROGRAM
Payer: COMMERCIAL

## 2024-07-15 ENCOUNTER — APPOINTMENT (OUTPATIENT)
Dept: PHYSICAL THERAPY | Facility: REHABILITATION | Age: 34
End: 2024-07-15
Attending: STUDENT IN AN ORGANIZED HEALTH CARE EDUCATION/TRAINING PROGRAM
Payer: COMMERCIAL

## 2024-07-22 ENCOUNTER — APPOINTMENT (OUTPATIENT)
Dept: PHYSICAL THERAPY | Facility: REHABILITATION | Age: 34
End: 2024-07-22
Attending: STUDENT IN AN ORGANIZED HEALTH CARE EDUCATION/TRAINING PROGRAM
Payer: COMMERCIAL

## 2024-08-13 ENCOUNTER — HOSPITAL ENCOUNTER (EMERGENCY)
Facility: MEDICAL CENTER | Age: 34
End: 2024-08-13
Attending: EMERGENCY MEDICINE
Payer: COMMERCIAL

## 2024-08-13 ENCOUNTER — OFFICE VISIT (OUTPATIENT)
Dept: URGENT CARE | Facility: CLINIC | Age: 34
End: 2024-08-13
Payer: COMMERCIAL

## 2024-08-13 VITALS
BODY MASS INDEX: 19.14 KG/M2 | DIASTOLIC BLOOD PRESSURE: 71 MMHG | RESPIRATION RATE: 15 BRPM | SYSTOLIC BLOOD PRESSURE: 129 MMHG | WEIGHT: 133.38 LBS | TEMPERATURE: 97.1 F | OXYGEN SATURATION: 99 % | HEART RATE: 65 BPM

## 2024-08-13 VITALS
OXYGEN SATURATION: 96 % | HEART RATE: 63 BPM | HEIGHT: 70 IN | SYSTOLIC BLOOD PRESSURE: 115 MMHG | TEMPERATURE: 98.4 F | DIASTOLIC BLOOD PRESSURE: 65 MMHG | RESPIRATION RATE: 20 BRPM | WEIGHT: 135.8 LBS | BODY MASS INDEX: 19.44 KG/M2

## 2024-08-13 DIAGNOSIS — Z71.1 WORRIED WELL: ICD-10-CM

## 2024-08-13 DIAGNOSIS — Z20.9 EXPOSURE TO BAT WITHOUT KNOWN BITE: ICD-10-CM

## 2024-08-13 PROCEDURE — 99282 EMERGENCY DEPT VISIT SF MDM: CPT

## 2024-08-13 PROCEDURE — 99213 OFFICE O/P EST LOW 20 MIN: CPT | Performed by: NURSE PRACTITIONER

## 2024-08-13 ASSESSMENT — FIBROSIS 4 INDEX
FIB4 SCORE: 0.91
FIB4 SCORE: 0.91

## 2024-08-13 NOTE — ED PROVIDER NOTES
ER Provider Note    Scribed for Jose A Harvey M.d. by Alonso Covington. 8/13/2024  4:38 PM    Primary Care Provider: Pcp Pt States None    CHIEF COMPLAINT   Chief Complaint   Patient presents with    Other    Sent from Urgent Care       HPI/ROS  LIMITATION TO HISTORY   Select: : None  OUTSIDE HISTORIAN(S):  None    Osvaldo Newby is a 33 y.o. male who presents to the ED complaining of a possible rabies exposure onset 10 weeks ago. The patient reports 10 weeks ago a bat flew into his head and flew off. He went to Urgent Care earlier today where he was sent here for further evaluation.     PAST MEDICAL HISTORY  History reviewed. No pertinent past medical history.    SURGICAL HISTORY  History reviewed. No pertinent surgical history.    FAMILY HISTORY  Family History   Problem Relation Age of Onset    No Known Problems Mother     No Known Problems Father     Breast Cancer Maternal Grandmother     Prostate cancer Maternal Grandfather     Lung Cancer Paternal Grandmother        SOCIAL HISTORY   reports that he has never smoked. He has never used smokeless tobacco. He reports current alcohol use. He reports that he does not use drugs.    CURRENT MEDICATIONS  No current outpatient medications     ALLERGIES  Patient has no known allergies.    PHYSICAL EXAM  /71   Pulse 81   Temp 36.2 °C (97.2 °F) (Temporal)   Resp 16   Wt 60.5 kg (133 lb 6.1 oz)   SpO2 98%   BMI 19.14 kg/m²   Constitutional: Well developed, Well nourished, No acute distress, Non-toxic appearance.   Skin: Normal without rash.   Back: No CVA or spinal tenderness.   Extremities: Intact distal pulses, No edema, No tenderness, No cyanosis, No clubbing. Capillary refill is less than 2 seconds.  Musculoskeletal: Good range of motion in all major joints. No tenderness to palpation or major deformities noted.   Neurologic: Alert & oriented x 3, Normal motor function, Normal sensory function, No focal deficits noted. Reflexes are normal.  Psychiatric:  Affect normal, Judgment normal, Mood normal. There is no suicidal ideation or patient reported hallucinations.        COURSE & MEDICAL DECISION MAKING     ASSESSMENT, COURSE AND PLAN  Care Narrative:     4:40 PM - Patient seen and examined at bedside. The patient is a 33 year old male who presents to the ED for evaluation of a possible rabies exposure secondary to a bat hitting him in the head 10 weeks ago. I informed the patient this does not constitute a rabies exposure, and given the time line, symptoms would have already presented. I discussed plan for discharge and follow up as outlined below. The patient is stable for discharge at this time and will return for any new or worsening symptoms. Patient verbalizes understanding and support with my plan for discharge.        DISPOSITION AND DISCUSSIONS  I have discussed management of the patient with the following physicians and ROSIBEL's:  None    Discussion of management with other QHP or appropriate source(s): None     Barriers to care at this time, including but not limited to: Patient does not have established PCP.     The patient will return for new or worsening symptoms and is stable at the time of discharge.    DISPOSITION:  Patient will be discharged home in stable condition.    FOLLOW UP:  No follow-up provider specified.    FINAL DIAGNOSIS  1. Worried well       Alonso GARCIA (Casey), am scribing for, and in the presence of, Jose A Harvey M.D..    Electronically signed by: Alonso Covington (Casey), 8/13/2024    Jose A GARCIA M.D. personally performed the services described in this documentation, as scribed by Alonso Covington in my presence, and it is both accurate and complete.      The note accurately reflects work and decisions made by me.  Jose A Harvey M.D.  8/13/2024  5:18 PM

## 2024-08-13 NOTE — PROGRESS NOTES
"Subjective:   Osvaldo Newby is a 33 y.o. male who presents for Other (About a month ago was walking dog and was hit by a BAT, was not scratch, or bitten, would like to know if it was Rabies exposure )      Other    33-year-old male presents with concern that he was exposed to a bat.  1 to 2 months ago he was down walking his dog by the, and a bat hit the left side of his head.  He did not believe he had a bite or scratch but after speaking with her friend recently filled on that should have been considered an exposure to a bat.  He is here today with concern for exposure, and looking for reassurance.      Review of Systems   All other systems reviewed and are negative.      Medications, Allergies, and current problem list reviewed today in Epic.     Objective:     /65 (BP Location: Left arm, Patient Position: Sitting, BP Cuff Size: Adult)   Pulse 63   Temp 36.9 °C (98.4 °F) (Temporal)   Resp 20   Ht 1.778 m (5' 10\")   Wt 61.6 kg (135 lb 12.8 oz)   SpO2 96%   BMI 19.49 kg/m²     Physical Exam  Vitals and nursing note reviewed.   Constitutional:       General: He is not in acute distress.     Appearance: Normal appearance. He is normal weight. He is not ill-appearing, toxic-appearing or diaphoretic.   HENT:      Head: Normocephalic and atraumatic.      Right Ear: External ear normal.      Left Ear: External ear normal.      Nose: Nose normal.      Mouth/Throat:      Mouth: Mucous membranes are moist.   Eyes:      Extraocular Movements: Extraocular movements intact.      Conjunctiva/sclera: Conjunctivae normal.      Pupils: Pupils are equal, round, and reactive to light.   Cardiovascular:      Rate and Rhythm: Normal rate.   Pulmonary:      Effort: Pulmonary effort is normal.   Musculoskeletal:         General: Normal range of motion.      Cervical back: Normal range of motion.   Skin:     General: Skin is warm and dry.   Neurological:      General: No focal deficit present.      Mental Status: He " is alert and oriented to person, place, and time.   Psychiatric:         Mood and Affect: Mood normal.         Results for orders placed or performed during the hospital encounter of 04/15/22   FRANCISCO REFLEXIVE PROFILE   Result Value Ref Range    Antinuclear Antibody None Detected None Detected   FERRITIN   Result Value Ref Range    Ferritin 252.0 22.0 - 322.0 ng/mL   CBC WITH DIFFERENTIAL   Result Value Ref Range    WBC 5.8 4.8 - 10.8 K/uL    RBC 5.09 4.70 - 6.10 M/uL    Hemoglobin 15.5 14.0 - 18.0 g/dL    Hematocrit 45.8 42.0 - 52.0 %    MCV 90.0 81.4 - 97.8 fL    MCH 30.5 27.0 - 33.0 pg    MCHC 33.8 33.7 - 35.3 g/dL    RDW 39.9 35.9 - 50.0 fL    Platelet Count 256 164 - 446 K/uL    MPV 9.5 9.0 - 12.9 fL    Neutrophils-Polys 51.10 44.00 - 72.00 %    Lymphocytes 35.60 22.00 - 41.00 %    Monocytes 10.10 0.00 - 13.40 %    Eosinophils 2.10 0.00 - 6.90 %    Basophils 0.90 0.00 - 1.80 %    Immature Granulocytes 0.20 0.00 - 0.90 %    Nucleated RBC 0.00 /100 WBC    Neutrophils (Absolute) 2.95 1.82 - 7.42 K/uL    Lymphs (Absolute) 2.05 1.00 - 4.80 K/uL    Monos (Absolute) 0.58 0.00 - 0.85 K/uL    Eos (Absolute) 0.12 0.00 - 0.51 K/uL    Baso (Absolute) 0.05 0.00 - 0.12 K/uL    Immature Granulocytes (abs) 0.01 0.00 - 0.11 K/uL    NRBC (Absolute) 0.00 K/uL   URIC ACID   Result Value Ref Range    Uric Acid 6.5 2.5 - 8.3 mg/dL   TSH WITH REFLEX TO FT4   Result Value Ref Range    TSH 2.680 0.380 - 5.330 uIU/mL   CCP ANTIBODY   Result Value Ref Range    Ccp Antibodies 4 0 - 19 Units   Sed Rate   Result Value Ref Range    Sed Rate Westergren 5 0 - 20 mm/hour   RHEUMATOID ARTHRITIS FACTOR   Result Value Ref Range    Rheumatoid Factor -Neph- <10 0 - 14 IU/mL   Comp Metabolic Panel   Result Value Ref Range    Sodium 145 135 - 145 mmol/L    Potassium 4.3 3.6 - 5.5 mmol/L    Chloride 106 96 - 112 mmol/L    Co2 26 20 - 33 mmol/L    Anion Gap 13.0 7.0 - 16.0    Glucose 92 65 - 99 mg/dL    Bun 11 8 - 22 mg/dL    Creatinine 0.93 0.50 - 1.40  mg/dL    Calcium 9.5 8.5 - 10.5 mg/dL    AST(SGOT) 14 12 - 45 U/L    ALT(SGPT) 23 2 - 50 U/L    Alkaline Phosphatase 57 30 - 99 U/L    Total Bilirubin 0.8 0.1 - 1.5 mg/dL    Albumin 5.0 (H) 3.2 - 4.9 g/dL    Total Protein 8.0 6.0 - 8.2 g/dL    Globulin 3.0 1.9 - 3.5 g/dL    A-G Ratio 1.7 g/dL   ESTIMATED GFR   Result Value Ref Range    GFR (CKD-EPI) 112 >60 mL/min/1.73 m 2     No results found.  Assessment   Assessment/Plan:     Diagnosis and associated orders:     1. Exposure to bat without known bite     Comments/MDM:   Pleasant 33-year-old male presents with concern for bat exposure.  Vital signs are stable, afebrile.  He is in no acute distress and does not appear ill.  Discussed possible exposure, based on his history unfortunately I cannot provide the reassurance he is looking for today.  Unfortunately with need to go to the emergency department for treatment if required.  Calling the [facility] rabies hotline.  For public, report the bite to the [facility] at [phone] and [facility] at [phone]              Please note that this dictation was created using voice recognition software. I have made every reasonable attempt to correct obvious errors, but I expect that there may be errors of grammar and possibly content that I did not discover before finalizing the note.   This note was electronically signed by GERARDO Gamino

## 2024-08-13 NOTE — ED TRIAGE NOTES
"Pt comes in reporting he was walking his dog almost 2 months ago when a bat hit him in the head. Pt stating he does not believe he was bite or scratched. Pt stating \"it hit my head and flew off\"  "

## 2025-11-17 ENCOUNTER — APPOINTMENT (OUTPATIENT)
Dept: MEDICAL GROUP | Facility: MEDICAL CENTER | Age: 35
End: 2025-11-17
Payer: COMMERCIAL